# Patient Record
(demographics unavailable — no encounter records)

---

## 2025-03-10 NOTE — HISTORY OF PRESENT ILLNESS
[de-identified] : Ms. AYAD REGALADO 91 year  female  with a PMH of herpes zoster, has postherpetic neuralgia for the past 4 years, vitamin D deficiency h/o skin Cancer   present to the office for a physical exam. Patient feels OK, c/o tiredness.  Has some bioler problem in the house

## 2025-03-10 NOTE — PLAN
[FreeTextEntry1] : Ms. AYAD REGALADO 91 year  female  with a PMH of herpes zoster, has postherpetic neuralgia for the past 4 years, vitamin D deficiency h/o skin Cancer   present to the office for a physical exam Well adult exam is performed. Recommend  to do a blood test today, further management will depend on the blood test results.  EKG was done and reviewed, NSR 61 bpm, Has T inversion in lead V1, new from the last time EKG, recommend  to f/u with a cardiologist - refused referral Postherpetic neuralgia recommend to take Neurontin, patient refused as she was taking in the past.  Recommend to f/u with a dermatologist for a full skin exam, had h/o skin cancer, has fair skin  RTC to f/u in 2 wks. Patient is verbalized understanding

## 2025-03-10 NOTE — PHYSICAL EXAM
[TextEntry] : Constitutional: Well  developed, well appearing, not in acute distress Head: Normocephalic, no lesions Eyes: PERRLA, conjunctiva is NL, clear Ear: Ear canal is normal, tympanic membrane is intact Nose: Nasal turbinates are NL Throat: Clear, no exudates, no lesions Neck: Supple, no masses Chest: Lungs are clear, no rales, no rhonchi, no wheezing Heart: Regular rate, no murmurs Breast: b/l breast is symmetric, areola and nipple is NL, breast is atrophic. Axillary LN is NL b/l Abdomen: Soft, no tenderness, no masses, bowel sounds are normal : No CVAT Extremities: FROM, no deformities, no edema Musculoskeletal: has no tenderness of the spine, ROM is NL, has scoliosis Skin: Has fair skin,  has multiple seborrheic keratotic lesion on the back, chest. Has a varicose vein of the lower extremities Neuro: AAO x 3, no focal neurological deficit. Psychiatric: oriented to person, place, and time and insight and judgment were intact.

## 2025-03-10 NOTE — HEALTH RISK ASSESSMENT
[Good] : ~his/her~  mood as  good [Yes] : Yes [4 or more  times a week (4 pts)] : 4 or more  times a week (4 points) [1 or 2 (0 pts)] : 1 or 2 (0 points) [Never (0 pts)] : Never (0 points) [No] : In the past 12 months have you used drugs other than those required for medical reasons? No [No falls in past year] : Patient reported no falls in the past year [Little interest or pleasure doing things] : 1) Little interest or pleasure doing things [Feeling down, depressed, or hopeless] : 2) Feeling down, depressed, or hopeless [0] : 2) Feeling down, depressed, or hopeless: Not at all (0) [PHQ-2 Negative - No further assessment needed] : PHQ-2 Negative - No further assessment needed [Former] : Former [> 15 Years] : > 15 Years [HIV test declined] : HIV test declined [Hepatitis C test declined] : Hepatitis C test declined [None] : None [# of Members in Household ___] :  household currently consist of [unfilled] member(s) [Retired] : retired [Graduate School] : graduate school [] :  [# Of Children ___] : has [unfilled] children [Feels Safe at Home] : Feels safe at home [Fully functional (bathing, dressing, toileting, transferring, walking, feeding)] : Fully functional (bathing, dressing, toileting, transferring, walking, feeding) [Fully functional (using the telephone, shopping, preparing meals, housekeeping, doing laundry, using] : Fully functional and needs no help or supervision to perform IADLs (using the telephone, shopping, preparing meals, housekeeping, doing laundry, using transportation, managing medications and managing finances) [Reports changes in dental health] : Reports changes in dental health [Smoke Detector] : smoke detector [Carbon Monoxide Detector] : carbon monoxide detector [Safety elements used in home] : safety elements used in home [Seat Belt] :  uses seat belt [With Patient/Caregiver] : , with patient/caregiver [Designated Healthcare Proxy] : Designated healthcare proxy [Name: ___] : Health Care Proxy's Name: [unfilled]  [Relationship: ___] : Relationship: [unfilled] [Comfort care only] : comfort care only [DNR] : DNR [DNI] : DNI [I will adhere to the patient's wishes.] : I will adhere to the patient's wishes. [Time Spent: ___ minutes] : Time Spent: [unfilled] minutes [Reviewed no changes] : Reviewed, no changes [de-identified] : no [Audit-CScore] : 4 [de-identified] : walking [de-identified] : regular [[id number]] : 0 [Change in mental status noted] : No change in mental status noted [Language] : denies difficulty with language [Behavior] : denies difficulty with behavior [Learning/Retaining New Information] : denies difficulty learning/retaining new information [Handling Complex Tasks] : denies difficulty handling complex tasks [Reports changes in hearing] : Reports no changes in hearing [Reports changes in vision] : Reports no changes in vision [Sunscreen] : does not use sunscreen [MammogramDate] : long time ago [MammogramComments] : refused screening [PapSmearDate] : long time ago [BoneDensityDate] : never did [BoneDensityComments] : refused screening [ColonoscopyDate] : long time ago [ColonoscopyComments] : refused [de-identified] : with  [de-identified] : former  [de-identified] : needs to see dentist [AdvancecareDate] : 03/10/2025

## 2025-03-10 NOTE — HEALTH RISK ASSESSMENT
[Good] : ~his/her~  mood as  good [Yes] : Yes [4 or more  times a week (4 pts)] : 4 or more  times a week (4 points) [1 or 2 (0 pts)] : 1 or 2 (0 points) [Never (0 pts)] : Never (0 points) [No] : In the past 12 months have you used drugs other than those required for medical reasons? No [No falls in past year] : Patient reported no falls in the past year [Little interest or pleasure doing things] : 1) Little interest or pleasure doing things [Feeling down, depressed, or hopeless] : 2) Feeling down, depressed, or hopeless [0] : 2) Feeling down, depressed, or hopeless: Not at all (0) [PHQ-2 Negative - No further assessment needed] : PHQ-2 Negative - No further assessment needed [Former] : Former [> 15 Years] : > 15 Years [HIV test declined] : HIV test declined [Hepatitis C test declined] : Hepatitis C test declined [None] : None [# of Members in Household ___] :  household currently consist of [unfilled] member(s) [Retired] : retired [Graduate School] : graduate school [] :  [# Of Children ___] : has [unfilled] children [Feels Safe at Home] : Feels safe at home [Fully functional (bathing, dressing, toileting, transferring, walking, feeding)] : Fully functional (bathing, dressing, toileting, transferring, walking, feeding) [Fully functional (using the telephone, shopping, preparing meals, housekeeping, doing laundry, using] : Fully functional and needs no help or supervision to perform IADLs (using the telephone, shopping, preparing meals, housekeeping, doing laundry, using transportation, managing medications and managing finances) [Reports changes in dental health] : Reports changes in dental health [Smoke Detector] : smoke detector [Carbon Monoxide Detector] : carbon monoxide detector [Safety elements used in home] : safety elements used in home [Seat Belt] :  uses seat belt [With Patient/Caregiver] : , with patient/caregiver [Designated Healthcare Proxy] : Designated healthcare proxy [Name: ___] : Health Care Proxy's Name: [unfilled]  [Relationship: ___] : Relationship: [unfilled] [Comfort care only] : comfort care only [DNR] : DNR [DNI] : DNI [I will adhere to the patient's wishes.] : I will adhere to the patient's wishes. [Time Spent: ___ minutes] : Time Spent: [unfilled] minutes [Reviewed no changes] : Reviewed, no changes [de-identified] : no [Audit-CScore] : 4 [de-identified] : walking [de-identified] : regular [[id number]] : 0 [Change in mental status noted] : No change in mental status noted [Language] : denies difficulty with language [Behavior] : denies difficulty with behavior [Learning/Retaining New Information] : denies difficulty learning/retaining new information [Handling Complex Tasks] : denies difficulty handling complex tasks [Reports changes in hearing] : Reports no changes in hearing [Reports changes in vision] : Reports no changes in vision [Sunscreen] : does not use sunscreen [MammogramDate] : long time ago [MammogramComments] : refused screening [PapSmearDate] : long time ago [BoneDensityDate] : never did [BoneDensityComments] : refused screening [ColonoscopyDate] : long time ago [ColonoscopyComments] : refused [de-identified] : with  [de-identified] : former  [de-identified] : needs to see dentist [AdvancecareDate] : 03/10/2025

## 2025-03-10 NOTE — REVIEW OF SYSTEMS
[TextEntry] : Constitutional: Denies fever,   recent changes in the weight, c/o fatique Head: Denies headache, dizziness Eyes: Denies diplopia, tearing or pain Ears: Denies earache, tinnitus, hearing loss Nose: Denies nasal obstruction,  epistaxis Throat: Denies throat pain Neck: Denies stiffness, muscle tenderness Chest: Denies cough, SOB, wheezing, chest congestion CV: Denies chest pain, palpitation GI: Denies abdominal pain, constipation, heartburn Genitourinary: Denies dysuria, urinary urgency Musculoskeletal: Denies joint pain Neuro: Denies changes in mental status Psychiatric: Denies depressive symptoms, change in sleep habits, changes in thought content